# Patient Record
Sex: FEMALE | Race: OTHER | HISPANIC OR LATINO | URBAN - METROPOLITAN AREA
[De-identification: names, ages, dates, MRNs, and addresses within clinical notes are randomized per-mention and may not be internally consistent; named-entity substitution may affect disease eponyms.]

---

## 2021-08-14 ENCOUNTER — INPATIENT (INPATIENT)
Facility: HOSPITAL | Age: 86
LOS: 2 days | Discharge: HOME | End: 2021-08-17
Attending: HOSPITALIST | Admitting: HOSPITALIST
Payer: MEDICARE

## 2021-08-14 VITALS
SYSTOLIC BLOOD PRESSURE: 105 MMHG | DIASTOLIC BLOOD PRESSURE: 67 MMHG | TEMPERATURE: 98 F | HEART RATE: 85 BPM | OXYGEN SATURATION: 95 % | RESPIRATION RATE: 18 BRPM

## 2021-08-14 DIAGNOSIS — U07.1 COVID-19: ICD-10-CM

## 2021-08-14 LAB
ALBUMIN SERPL ELPH-MCNC: 3.7 G/DL — SIGNIFICANT CHANGE UP (ref 3.3–5)
ALP SERPL-CCNC: 60 U/L — SIGNIFICANT CHANGE UP (ref 40–120)
ALT FLD-CCNC: 13 U/L — SIGNIFICANT CHANGE UP (ref 4–33)
ANION GAP SERPL CALC-SCNC: 12 MMOL/L — SIGNIFICANT CHANGE UP (ref 7–14)
APPEARANCE UR: CLEAR — SIGNIFICANT CHANGE UP
AST SERPL-CCNC: 19 U/L — SIGNIFICANT CHANGE UP (ref 4–32)
BASOPHILS # BLD AUTO: 0.05 K/UL — SIGNIFICANT CHANGE UP (ref 0–0.2)
BASOPHILS NFR BLD AUTO: 0.4 % — SIGNIFICANT CHANGE UP (ref 0–2)
BILIRUB SERPL-MCNC: 0.4 MG/DL — SIGNIFICANT CHANGE UP (ref 0.2–1.2)
BILIRUB UR-MCNC: NEGATIVE — SIGNIFICANT CHANGE UP
BUN SERPL-MCNC: 18 MG/DL — SIGNIFICANT CHANGE UP (ref 7–23)
CALCIUM SERPL-MCNC: 9.6 MG/DL — SIGNIFICANT CHANGE UP (ref 8.4–10.5)
CHLORIDE SERPL-SCNC: 101 MMOL/L — SIGNIFICANT CHANGE UP (ref 98–107)
CO2 SERPL-SCNC: 23 MMOL/L — SIGNIFICANT CHANGE UP (ref 22–31)
COLOR SPEC: YELLOW — SIGNIFICANT CHANGE UP
CREAT SERPL-MCNC: 0.82 MG/DL — SIGNIFICANT CHANGE UP (ref 0.5–1.3)
CRP SERPL-MCNC: 17.8 MG/L — HIGH
D DIMER BLD IA.RAPID-MCNC: 601 NG/ML DDU — HIGH
DIFF PNL FLD: ABNORMAL
EOSINOPHIL # BLD AUTO: 0.36 K/UL — SIGNIFICANT CHANGE UP (ref 0–0.5)
EOSINOPHIL NFR BLD AUTO: 3.1 % — SIGNIFICANT CHANGE UP (ref 0–6)
FERRITIN SERPL-MCNC: 108 NG/ML — SIGNIFICANT CHANGE UP (ref 15–150)
GLUCOSE SERPL-MCNC: 126 MG/DL — HIGH (ref 70–99)
GLUCOSE UR QL: NEGATIVE — SIGNIFICANT CHANGE UP
HCT VFR BLD CALC: 38 % — SIGNIFICANT CHANGE UP (ref 34.5–45)
HGB BLD-MCNC: 12.4 G/DL — SIGNIFICANT CHANGE UP (ref 11.5–15.5)
IANC: 7.35 K/UL — SIGNIFICANT CHANGE UP (ref 1.5–8.5)
IMM GRANULOCYTES NFR BLD AUTO: 0.9 % — SIGNIFICANT CHANGE UP (ref 0–1.5)
KETONES UR-MCNC: NEGATIVE — SIGNIFICANT CHANGE UP
LEUKOCYTE ESTERASE UR-ACNC: NEGATIVE — SIGNIFICANT CHANGE UP
LYMPHOCYTES # BLD AUTO: 2.87 K/UL — SIGNIFICANT CHANGE UP (ref 1–3.3)
LYMPHOCYTES # BLD AUTO: 24.6 % — SIGNIFICANT CHANGE UP (ref 13–44)
MCHC RBC-ENTMCNC: 29.1 PG — SIGNIFICANT CHANGE UP (ref 27–34)
MCHC RBC-ENTMCNC: 32.6 GM/DL — SIGNIFICANT CHANGE UP (ref 32–36)
MCV RBC AUTO: 89.2 FL — SIGNIFICANT CHANGE UP (ref 80–100)
MONOCYTES # BLD AUTO: 0.92 K/UL — HIGH (ref 0–0.9)
MONOCYTES NFR BLD AUTO: 7.9 % — SIGNIFICANT CHANGE UP (ref 2–14)
NEUTROPHILS # BLD AUTO: 7.35 K/UL — SIGNIFICANT CHANGE UP (ref 1.8–7.4)
NEUTROPHILS NFR BLD AUTO: 63.1 % — SIGNIFICANT CHANGE UP (ref 43–77)
NITRITE UR-MCNC: POSITIVE
NRBC # BLD: 0 /100 WBCS — SIGNIFICANT CHANGE UP
NRBC # FLD: 0 K/UL — SIGNIFICANT CHANGE UP
NT-PROBNP SERPL-SCNC: 269 PG/ML — SIGNIFICANT CHANGE UP
PH UR: 6 — SIGNIFICANT CHANGE UP (ref 5–8)
PLATELET # BLD AUTO: 327 K/UL — SIGNIFICANT CHANGE UP (ref 150–400)
POTASSIUM SERPL-MCNC: 4.8 MMOL/L — SIGNIFICANT CHANGE UP (ref 3.5–5.3)
POTASSIUM SERPL-SCNC: 4.8 MMOL/L — SIGNIFICANT CHANGE UP (ref 3.5–5.3)
PROCALCITONIN SERPL-MCNC: 0.03 NG/ML — SIGNIFICANT CHANGE UP (ref 0.02–0.1)
PROT SERPL-MCNC: 7 G/DL — SIGNIFICANT CHANGE UP (ref 6–8.3)
PROT UR-MCNC: ABNORMAL
RBC # BLD: 4.26 M/UL — SIGNIFICANT CHANGE UP (ref 3.8–5.2)
RBC # FLD: 12.9 % — SIGNIFICANT CHANGE UP (ref 10.3–14.5)
SODIUM SERPL-SCNC: 136 MMOL/L — SIGNIFICANT CHANGE UP (ref 135–145)
SP GR SPEC: 1.02 — SIGNIFICANT CHANGE UP (ref 1.01–1.02)
TROPONIN T, HIGH SENSITIVITY RESULT: 12 NG/L — SIGNIFICANT CHANGE UP
TSH SERPL-MCNC: 0.49 UIU/ML — SIGNIFICANT CHANGE UP (ref 0.27–4.2)
UROBILINOGEN FLD QL: SIGNIFICANT CHANGE UP
WBC # BLD: 11.66 K/UL — HIGH (ref 3.8–10.5)
WBC # FLD AUTO: 11.66 K/UL — HIGH (ref 3.8–10.5)

## 2021-08-14 PROCEDURE — 71045 X-RAY EXAM CHEST 1 VIEW: CPT | Mod: 26

## 2021-08-14 PROCEDURE — 99285 EMERGENCY DEPT VISIT HI MDM: CPT | Mod: 25

## 2021-08-14 PROCEDURE — 93010 ELECTROCARDIOGRAM REPORT: CPT

## 2021-08-14 RX ORDER — ACETAMINOPHEN 500 MG
975 TABLET ORAL ONCE
Refills: 0 | Status: DISCONTINUED | OUTPATIENT
Start: 2021-08-14 | End: 2021-08-14

## 2021-08-14 RX ORDER — ACETAMINOPHEN 500 MG
1000 TABLET ORAL ONCE
Refills: 0 | Status: COMPLETED | OUTPATIENT
Start: 2021-08-14 | End: 2021-08-14

## 2021-08-14 RX ADMIN — Medication 400 MILLIGRAM(S): at 21:43

## 2021-08-14 NOTE — ED PROVIDER NOTE - CLINICAL SUMMARY MEDICAL DECISION MAKING FREE TEXT BOX
ID: [id number] (English Ambrocio) - 89 female, Hx: HTN, HLD, Parkinsons, Dementia - presents 2 weeks positive for COVID, with failure to thrive, decreased PO intake, and inability to care for herself. Pt is non-verbal at baseline, lives with daughter, but the family is no longer able to care for her. Exam, presentation, and history concerning for likely FTT in the setting of infection - evaluation is NOT consistent with acute CVA (moving all 4 extremities, no acute change in mental status), meningitis, encephalitis (neck supple, no rashes, non-toxic). Plan: CBC, CMP, EKG, CXR, UA, UCx, COVID, Tylenol, TBA. VSS, non-toxic. 89 yr old female c HTN, HLD, Parkinsons, Dementia - presents 2 weeks positive for COVID, with failure to thrive, decreased PO intake, and inability to care for herself. Has known covid infection, unvaccinated, appears to be worsening and family is unable to care for her.  Exam, presentation, and history concerning for likely FTT in the setting of infection.  Has mild resp distress.  Is non verbal at baseline but no e/o new neuro deficits.  Will send labs, CXR, provide manid hydration, admit.

## 2021-08-14 NOTE — ED ADULT NURSE NOTE - OBJECTIVE STATEMENT
Received PT to room 22, AAO1, nonambulatory, Czech speaking, Pmhx: HTN, HLD, Parkinsons, Dementia. S/p covid + x 2weeks, FTT, decreased PO intake. Presents to ED, lethargic. Arouses to verbal and physical stimuli. Respirations even and unlabored, sp02 98% on RA. Incontinent with diaper, redness and excoriation noted to pelvic area. LAC 20g placed, labs drawn, straight cath with sterile technique - yellow urine output, covid swabbed, bed in lowest position, will continue to monitor.

## 2021-08-14 NOTE — ED PROVIDER NOTE - ATTENDING CONTRIBUTION TO CARE
Attending Attestation: Dr. Ortega  I have personally performed a history and physical examination of the patient and discussed management with the resident as well as the patient.  I reviewed the resident's note and agree with the documented findings and plan of care.  I have authored and modified critical sections of the Provider Note, including but not limited to HPI, Physical Exam and MDM.  89 yr old female c HTN, HLD, Parkinsons, Dementia - presents 2 weeks positive for COVID, with failure to thrive, decreased PO intake, and inability to care for herself. Has known covid infection, unvaccinated, appears to be worsening and family is unable to care for her.  Exam, presentation, and history concerning for likely FTT in the setting of infection.  Has mild resp distress.  Is non verbal at baseline but no e/o new neuro deficits.  Will send labs, CXR, provide mandi hydration, admit.

## 2021-08-14 NOTE — ED PROVIDER NOTE - PHYSICAL EXAMINATION
GEN - NAD; non-toxic; A+O x 0, non-verbal  HENT - NC/AT, No visible Ecchymosis, No Abrasions, No Lac/Tears, MMM, no discharge  EYES - EOMI, PERRL, no conjunctival pallor, no scleral icterus  NECK - Neck supple, No LAD, No Swelling  PULM - Tachypneic; Coarse B/L,  symmetric breath sounds  CV -  RRR, S1 S2, no murmurs 2+ Pulses B/L UE  GI - NT/ND, soft, no guarding, no rebound, no masses    MSK/EXT- no edema, no gross deformity, warm and well perfused, no calf tenderness/swelling/erythema   SKIN - no rash or bruising  NEUROLOGIC - alert, moving all 4 ext with 5/5 Strength

## 2021-08-14 NOTE — ED PROVIDER NOTE - OBJECTIVE STATEMENT
ID: [id number] (Croatian Ambrocio) - 89 female, Hx: HTN, HLD, Parkinsons, Dementia - presents 2 weeks positive for COVID, with failure to thrive, decreased PO intake, and inability to care for herself. Pt is non-verbal at baseline, lives with daughter, but the family is no longer able to care for her. Reports subjective fevers, dyspnea; denies any diarrhea, bloody stools, foul odor from urine. Denies any trauma/falls. Not vaccinated.     Dieter Daughter: [phone]  Lucrecia Granddaughter: [phone]  PMD: Unknown  ID: [id number] (Greek Ambrocio) - 89 yr old female, Hx: HTN, HLD, Parkinsons, Dementia - presents 2 weeks positive for COVID, with failure to thrive, decreased PO intake, and inability to care for herself. Pt is non-verbal at baseline, lives with daughter, but the family is no longer able to care for her. Family reports subjective fevers, dyspnea; denies any diarrhea, bloody stools, foul odor from urine. Denies any trauma/falls. Not vaccinated.     Dieter Daughter: [phone]  Lucrecia Granddaughter: [phone]  PMD: Unknown

## 2021-08-14 NOTE — ED PROVIDER NOTE - CARE PLAN
Principal Discharge DX:	Adult failure to thrive  Secondary Diagnosis:	2019 novel coronavirus disease (COVID-19)   1

## 2021-08-14 NOTE — ED ADULT TRIAGE NOTE - CHIEF COMPLAINT QUOTE
Pt brought in by family for cough and decrease appetite, positive covid. pt nonambulatory, nonverbal. Endorses subjected fever/chills and weakness  Hx Parkinson, alzheimer, dm and hld Pt brought in by family for cough and decrease appetite, positive covid. pt nonambulatory, nonverbal. Endorses subjected fever/chills and weakness  Hx Parkinson, alzheimer, dm and hld  daughter Dieter [phone]

## 2021-08-14 NOTE — ED PROVIDER NOTE - PROGRESS NOTE DETAILS
Resident Lv: preliminary evaluation concerning for COVID-19 related weakness and FTT. Case endorsed to Hospitalist (MD Brian).

## 2021-08-14 NOTE — ED ADULT NURSE NOTE - CHIEF COMPLAINT QUOTE
Pt brought in by family for cough and decrease appetite, positive covid. pt nonambulatory, nonverbal. Endorses subjected fever/chills and weakness  Hx Parkinson, alzheimer, dm and hld  daughter Dieter [phone]

## 2021-08-15 DIAGNOSIS — Z29.9 ENCOUNTER FOR PROPHYLACTIC MEASURES, UNSPECIFIED: ICD-10-CM

## 2021-08-15 DIAGNOSIS — G20 PARKINSON'S DISEASE: ICD-10-CM

## 2021-08-15 DIAGNOSIS — N39.0 URINARY TRACT INFECTION, SITE NOT SPECIFIED: ICD-10-CM

## 2021-08-15 DIAGNOSIS — E11.9 TYPE 2 DIABETES MELLITUS WITHOUT COMPLICATIONS: ICD-10-CM

## 2021-08-15 DIAGNOSIS — R13.10 DYSPHAGIA, UNSPECIFIED: ICD-10-CM

## 2021-08-15 DIAGNOSIS — F03.90 UNSPECIFIED DEMENTIA, UNSPECIFIED SEVERITY, WITHOUT BEHAVIORAL DISTURBANCE, PSYCHOTIC DISTURBANCE, MOOD DISTURBANCE, AND ANXIETY: ICD-10-CM

## 2021-08-15 DIAGNOSIS — U07.1 COVID-19: ICD-10-CM

## 2021-08-15 DIAGNOSIS — E78.5 HYPERLIPIDEMIA, UNSPECIFIED: ICD-10-CM

## 2021-08-15 LAB
24R-OH-CALCIDIOL SERPL-MCNC: 31.7 NG/ML — SIGNIFICANT CHANGE UP (ref 30–80)
A1C WITH ESTIMATED AVERAGE GLUCOSE RESULT: 6.1 % — HIGH (ref 4–5.6)
ANION GAP SERPL CALC-SCNC: 13 MMOL/L — SIGNIFICANT CHANGE UP (ref 7–14)
APTT BLD: 31.1 SEC — SIGNIFICANT CHANGE UP (ref 27–36.3)
BUN SERPL-MCNC: 16 MG/DL — SIGNIFICANT CHANGE UP (ref 7–23)
CALCIUM SERPL-MCNC: 9.5 MG/DL — SIGNIFICANT CHANGE UP (ref 8.4–10.5)
CHLORIDE SERPL-SCNC: 103 MMOL/L — SIGNIFICANT CHANGE UP (ref 98–107)
CHOLEST SERPL-MCNC: 119 MG/DL — SIGNIFICANT CHANGE UP
CO2 SERPL-SCNC: 23 MMOL/L — SIGNIFICANT CHANGE UP (ref 22–31)
CREAT SERPL-MCNC: 0.74 MG/DL — SIGNIFICANT CHANGE UP (ref 0.5–1.3)
CRP SERPL-MCNC: 19.4 MG/L — HIGH
D DIMER BLD IA.RAPID-MCNC: 693 NG/ML DDU — HIGH
ESTIMATED AVERAGE GLUCOSE: 128 — SIGNIFICANT CHANGE UP
FERRITIN SERPL-MCNC: 115 NG/ML — SIGNIFICANT CHANGE UP (ref 15–150)
GLUCOSE BLDC GLUCOMTR-MCNC: 103 MG/DL — HIGH (ref 70–99)
GLUCOSE BLDC GLUCOMTR-MCNC: 108 MG/DL — HIGH (ref 70–99)
GLUCOSE BLDC GLUCOMTR-MCNC: 108 MG/DL — HIGH (ref 70–99)
GLUCOSE BLDC GLUCOMTR-MCNC: 113 MG/DL — HIGH (ref 70–99)
GLUCOSE BLDC GLUCOMTR-MCNC: 115 MG/DL — HIGH (ref 70–99)
GLUCOSE SERPL-MCNC: 106 MG/DL — HIGH (ref 70–99)
HCT VFR BLD CALC: 37.4 % — SIGNIFICANT CHANGE UP (ref 34.5–45)
HDLC SERPL-MCNC: 33 MG/DL — LOW
HGB BLD-MCNC: 12.5 G/DL — SIGNIFICANT CHANGE UP (ref 11.5–15.5)
INR BLD: 1.05 RATIO — SIGNIFICANT CHANGE UP (ref 0.88–1.16)
LACTATE SERPL-SCNC: 1.6 MMOL/L — SIGNIFICANT CHANGE UP (ref 0.5–2)
LDH SERPL L TO P-CCNC: 135 U/L — SIGNIFICANT CHANGE UP (ref 135–225)
LIPID PNL WITH DIRECT LDL SERPL: 57 MG/DL — SIGNIFICANT CHANGE UP
MAGNESIUM SERPL-MCNC: 1.8 MG/DL — SIGNIFICANT CHANGE UP (ref 1.6–2.6)
MCHC RBC-ENTMCNC: 29.6 PG — SIGNIFICANT CHANGE UP (ref 27–34)
MCHC RBC-ENTMCNC: 33.4 GM/DL — SIGNIFICANT CHANGE UP (ref 32–36)
MCV RBC AUTO: 88.6 FL — SIGNIFICANT CHANGE UP (ref 80–100)
NON HDL CHOLESTEROL: 86 MG/DL — SIGNIFICANT CHANGE UP
NRBC # BLD: 0 /100 WBCS — SIGNIFICANT CHANGE UP
NRBC # FLD: 0 K/UL — SIGNIFICANT CHANGE UP
PHOSPHATE SERPL-MCNC: 3.5 MG/DL — SIGNIFICANT CHANGE UP (ref 2.5–4.5)
PLATELET # BLD AUTO: 328 K/UL — SIGNIFICANT CHANGE UP (ref 150–400)
POTASSIUM SERPL-MCNC: 4.2 MMOL/L — SIGNIFICANT CHANGE UP (ref 3.5–5.3)
POTASSIUM SERPL-SCNC: 4.2 MMOL/L — SIGNIFICANT CHANGE UP (ref 3.5–5.3)
PROCALCITONIN SERPL-MCNC: 0.03 NG/ML — SIGNIFICANT CHANGE UP (ref 0.02–0.1)
PROTHROM AB SERPL-ACNC: 12 SEC — SIGNIFICANT CHANGE UP (ref 10.6–13.6)
RBC # BLD: 4.22 M/UL — SIGNIFICANT CHANGE UP (ref 3.8–5.2)
RBC # FLD: 12.9 % — SIGNIFICANT CHANGE UP (ref 10.3–14.5)
SARS-COV-2 RNA SPEC QL NAA+PROBE: DETECTED
SODIUM SERPL-SCNC: 139 MMOL/L — SIGNIFICANT CHANGE UP (ref 135–145)
TRIGL SERPL-MCNC: 144 MG/DL — SIGNIFICANT CHANGE UP
WBC # BLD: 10.5 K/UL — SIGNIFICANT CHANGE UP (ref 3.8–10.5)
WBC # FLD AUTO: 10.5 K/UL — SIGNIFICANT CHANGE UP (ref 3.8–10.5)

## 2021-08-15 PROCEDURE — 99223 1ST HOSP IP/OBS HIGH 75: CPT

## 2021-08-15 PROCEDURE — 12345: CPT | Mod: NC

## 2021-08-15 RX ORDER — SODIUM CHLORIDE 9 MG/ML
1000 INJECTION, SOLUTION INTRAVENOUS
Refills: 0 | Status: DISCONTINUED | OUTPATIENT
Start: 2021-08-15 | End: 2021-08-17

## 2021-08-15 RX ORDER — ENOXAPARIN SODIUM 100 MG/ML
40 INJECTION SUBCUTANEOUS DAILY
Refills: 0 | Status: DISCONTINUED | OUTPATIENT
Start: 2021-08-15 | End: 2021-08-17

## 2021-08-15 RX ORDER — CEFTRIAXONE 500 MG/1
1000 INJECTION, POWDER, FOR SOLUTION INTRAMUSCULAR; INTRAVENOUS EVERY 24 HOURS
Refills: 0 | Status: DISCONTINUED | OUTPATIENT
Start: 2021-08-16 | End: 2021-08-17

## 2021-08-15 RX ORDER — CEFTRIAXONE 500 MG/1
INJECTION, POWDER, FOR SOLUTION INTRAMUSCULAR; INTRAVENOUS
Refills: 0 | Status: DISCONTINUED | OUTPATIENT
Start: 2021-08-15 | End: 2021-08-17

## 2021-08-15 RX ORDER — DEXTROSE 50 % IN WATER 50 %
12.5 SYRINGE (ML) INTRAVENOUS ONCE
Refills: 0 | Status: DISCONTINUED | OUTPATIENT
Start: 2021-08-15 | End: 2021-08-17

## 2021-08-15 RX ORDER — CEFTRIAXONE 500 MG/1
1000 INJECTION, POWDER, FOR SOLUTION INTRAMUSCULAR; INTRAVENOUS ONCE
Refills: 0 | Status: COMPLETED | OUTPATIENT
Start: 2021-08-15 | End: 2021-08-15

## 2021-08-15 RX ORDER — ACETAMINOPHEN 500 MG
650 TABLET ORAL EVERY 6 HOURS
Refills: 0 | Status: DISCONTINUED | OUTPATIENT
Start: 2021-08-15 | End: 2021-08-16

## 2021-08-15 RX ORDER — DEXTROSE 50 % IN WATER 50 %
15 SYRINGE (ML) INTRAVENOUS ONCE
Refills: 0 | Status: DISCONTINUED | OUTPATIENT
Start: 2021-08-15 | End: 2021-08-17

## 2021-08-15 RX ORDER — DEXTROSE 50 % IN WATER 50 %
25 SYRINGE (ML) INTRAVENOUS ONCE
Refills: 0 | Status: DISCONTINUED | OUTPATIENT
Start: 2021-08-15 | End: 2021-08-17

## 2021-08-15 RX ORDER — ASPIRIN/CALCIUM CARB/MAGNESIUM 324 MG
1 TABLET ORAL
Qty: 0 | Refills: 0 | DISCHARGE

## 2021-08-15 RX ORDER — PREGABALIN 225 MG/1
1 CAPSULE ORAL
Qty: 0 | Refills: 0 | DISCHARGE

## 2021-08-15 RX ORDER — ALBUTEROL 90 UG/1
2 AEROSOL, METERED ORAL EVERY 6 HOURS
Refills: 0 | Status: DISCONTINUED | OUTPATIENT
Start: 2021-08-15 | End: 2021-08-17

## 2021-08-15 RX ORDER — INSULIN LISPRO 100/ML
VIAL (ML) SUBCUTANEOUS AT BEDTIME
Refills: 0 | Status: DISCONTINUED | OUTPATIENT
Start: 2021-08-15 | End: 2021-08-17

## 2021-08-15 RX ORDER — DONEPEZIL HYDROCHLORIDE 10 MG/1
1 TABLET, FILM COATED ORAL
Qty: 0 | Refills: 0 | DISCHARGE

## 2021-08-15 RX ORDER — SITAGLIPTIN 50 MG/1
1 TABLET, FILM COATED ORAL
Qty: 0 | Refills: 0 | DISCHARGE

## 2021-08-15 RX ORDER — INSULIN LISPRO 100/ML
VIAL (ML) SUBCUTANEOUS
Refills: 0 | Status: DISCONTINUED | OUTPATIENT
Start: 2021-08-15 | End: 2021-08-17

## 2021-08-15 RX ORDER — CARBIDOPA AND LEVODOPA 25; 100 MG/1; MG/1
1 TABLET ORAL
Qty: 0 | Refills: 0 | DISCHARGE

## 2021-08-15 RX ORDER — GLUCAGON INJECTION, SOLUTION 0.5 MG/.1ML
1 INJECTION, SOLUTION SUBCUTANEOUS ONCE
Refills: 0 | Status: DISCONTINUED | OUTPATIENT
Start: 2021-08-15 | End: 2021-08-17

## 2021-08-15 RX ORDER — PYRIDOXINE HCL (VITAMIN B6) 100 MG
1 TABLET ORAL
Qty: 0 | Refills: 0 | DISCHARGE

## 2021-08-15 RX ADMIN — CEFTRIAXONE 100 MILLIGRAM(S): 500 INJECTION, POWDER, FOR SOLUTION INTRAMUSCULAR; INTRAVENOUS at 05:26

## 2021-08-15 RX ADMIN — ENOXAPARIN SODIUM 40 MILLIGRAM(S): 100 INJECTION SUBCUTANEOUS at 16:50

## 2021-08-15 NOTE — H&P ADULT - PROBLEM SELECTOR PLAN 5
-BS = 126  -FS = 108.  -NPO due to failed dysphagia screen.  -F/U A1C  -FS QID  -Low corrective ISS with hypoglycemia protocol.

## 2021-08-15 NOTE — ED ADULT NURSE REASSESSMENT NOTE - NS ED NURSE REASSESS COMMENT FT1
Dysphagia screening fail. Only able to tolerate 5ml of water. Unsuccessful with 20ml of water. Pt proceeded to cough and spitting out water. ANTONY Medina made aware.

## 2021-08-15 NOTE — H&P ADULT - ASSESSMENT
89F, history of Parkinson's disease, also history of alzheimer's, DM2 and Hyperlipidemia, admitted for generalized weakness most likely due to COVID infection, UTI and failed bedside dysphagia screen.

## 2021-08-15 NOTE — H&P ADULT - NSHPLABSRESULTS_GEN_ALL_CORE
- CXR Preliminary : Clear  - UA : Positive Nitrates  - COVID PCR: Detected  - D Dimer= 601  - Trop= 12  - ProBNP 269  - CRP = 178  - Ferrtitin= 108.  - Procalcitonin- 0.03   - TSH = 0.69                          12.4   11.66 )-----------( 327      ( 14 Aug 2021 21:46 )             38.0   08-14    136  |  101  |  18  ----------------------------<  126<H>  4.8   |  23  |  0.82    Ca    9.6      14 Aug 2021 21:46    TPro  7.0  /  Alb  3.7  /  TBili  0.4  /  DBili  x   /  AST  19  /  ALT  13  /  AlkPhos  60  08-14

## 2021-08-15 NOTE — H&P ADULT - PROBLEM SELECTOR PLAN 1
- Failed bedside dysphagia screen.  - NPO including meds due to aspiration risk.  - Aspiration precautions.  - F/U Speech and swallow eval.  - Hold IVF due to COVID infection and risk of pleural effusion. -Airborne precautions.  -F/U Inflammatory markers.   -Give O2 VIA NC and titrate as per guidelines.  -Currently SPO2= 97% ra & RR= 18b/ min,  -Give Tylenol MI, Pro Air inhaler PRN and incentive spirometry.   -Prone the pt for ARSs.

## 2021-08-15 NOTE — H&P ADULT - HISTORY OF PRESENT ILLNESS
History and currenty home medications provided by the emergency contact/ daughter Oneyda Corral and her spouse by phone @ [phone], due to the pt unable to provide for information for her hospital visit.     89F, German speaking, ambulates with a wheelchair due to history of Parkinson's disease, also history of alzheimer's, DM2 and Hyperlipidemia, the pt has been noted with generalized weakness over the past week, recent family member, diagnosed with COVID, the pt was taken to an urgency center by a daughter on 8/14 and tested positive for COVID. The daughter then brought her to the ED due to the pt with generalized weakness over the past week. The daughter says she noticed the pt with a dry cough. Says the pt did not complain of HA, dizziness, SON, nausea, vomit, diarrhea, constipation, abdominal pain, dysuria, chills or body aches. The emergency contact says the pt never complains and when asked will most likely say " I'm good."    In the Ed, the pt failed the bedside dysphagia screen.   COVID PCR: Detected.  UA Positive Nitrates.     Vitals: T Max= 100.1 rectal, HR = 63b/ min, BP = 110/62, RR= 18b/ min, SPO2== 97% ra

## 2021-08-15 NOTE — H&P ADULT - PROBLEM SELECTOR PLAN 8
-VTE with Lovenox 40 mg sub cut daily.  -Fall, aspiration, safety and seizure precautions.  -Speech and swallow eval.

## 2021-08-15 NOTE — H&P ADULT - PROBLEM SELECTOR PLAN 2
-Airborne precautions.  -F/U Inflammatory markers.   -Give O2 VIA NC and titrate as per guidelines.  -Currently SPO2= 97% ra & RR= 18b/ min,  -Give Tylenol NM, Pro Air inhaler PRN and incentive spirometry.   -Prone the pt for ARSs. - Failed bedside dysphagia screen.  - NPO including meds due to aspiration risk.  - Aspiration precautions.  - F/U Speech and swallow eval.  - Hold IVF due to COVID infection and risk of pleural effusion.

## 2021-08-15 NOTE — H&P ADULT - ATTENDING COMMENTS
89 yr old female with a pmh of dementia AAO x 1, Parkinsons, HTN, HLD, T2DM, COVID + 8/13 (not vaccinated) presents with decreased oral intake as well as decreased responsiveness who was brought in by family as they are no longer able to care for her. Per ED note patient is nonverbal and lives with her daughter. Family reported subjective fevers and dyspnea.   Vitals in the ED: Tmax 100.1, HR 80, /58, RR 24->16 satting 99% RA    Review of Systems:   Unable to assess from patient herself as she is drowsy and lethargic  ROS per family as above     Physical Exam: PHYSICAL EXAM:  GENERAL: NAD, well-developed, well-nourished  HEAD:  Atraumatic, Normocephalic, swelling of left cheek/oral mucosa  EYES: EOMI, PERRL, conjunctiva and sclera clear  NECK: Supple, No JVD  CHEST/LUNG: clear breathe sounds bilaterally; No wheezes, rales or rhonchi  HEART: Regular rate and rhythm; No murmurs, rubs, or gallops, (+)S1, S2  ABDOMEN: Soft, Nontender, Nondistended; Normal Bowel sounds   EXTREMITIES:  2+ Peripheral Pulses, No clubbing, cyanosis, trace bilateral lower extremity edema  PSYCH: unable to assess due to patients mentation  NEUROLOGY: AAOx1(per family), non-focal  SKIN: No rashes or lesions    CXR reviewed by myself: no acute abnormality      COVID  Acute but stable  Recent diagnosis of COVID  Inflammatory markers elevated  Supportive Care    Dysphagia  New  Failed bedside swallow  NPO  Speech evaluation     UTI  New   Vitals in the ED: Tmax 100.1, HR 80, /58, RR 24->16 satting 99% RA  WBC 11.66, CRP 17.8  UA Nitrates + with many bacteria   Send Ucx  Rocephin x 3    COVID  Acute but stable  Recent diagnosis of COVID  Inflammatory markers elevated  Supportive Care    Dementia  Chronic moderate exacerbation  Continue donepezil 10mg daily once sees speech    Parkinsons  Chronic stable   Continue carbidopa-levodopa 10-100mg BID once sees speech    T2DM  Chronic moderate exacerbation     LDCS, currently NPO

## 2021-08-15 NOTE — H&P ADULT - PROBLEM SELECTOR PLAN 3
-UA positive Nitrates.  -Started on Ceftriaxone 1G IV daily.  -F/U Blood and urine cx.   -Tylenol MD   -IVF on hold due to COVID

## 2021-08-15 NOTE — H&P ADULT - NSHPSOCIALHISTORY_GEN_ALL_CORE
.  Lives with a daughter recently diagnosed with COVID.   Denies Nicotine, ETOH, Illicit/ recreational drug use/history.   Flu Vax: Denies  COVID Vax: Denies.

## 2021-08-16 LAB
ALBUMIN SERPL ELPH-MCNC: 3.5 G/DL — SIGNIFICANT CHANGE UP (ref 3.3–5)
ALP SERPL-CCNC: 49 U/L — SIGNIFICANT CHANGE UP (ref 40–120)
ALT FLD-CCNC: 10 U/L — SIGNIFICANT CHANGE UP (ref 4–33)
ANION GAP SERPL CALC-SCNC: 13 MMOL/L — SIGNIFICANT CHANGE UP (ref 7–14)
AST SERPL-CCNC: 15 U/L — SIGNIFICANT CHANGE UP (ref 4–32)
BILIRUB SERPL-MCNC: 0.6 MG/DL — SIGNIFICANT CHANGE UP (ref 0.2–1.2)
BUN SERPL-MCNC: 16 MG/DL — SIGNIFICANT CHANGE UP (ref 7–23)
CALCIUM SERPL-MCNC: 9.4 MG/DL — SIGNIFICANT CHANGE UP (ref 8.4–10.5)
CHLORIDE SERPL-SCNC: 99 MMOL/L — SIGNIFICANT CHANGE UP (ref 98–107)
CO2 SERPL-SCNC: 24 MMOL/L — SIGNIFICANT CHANGE UP (ref 22–31)
COVID-19 SPIKE DOMAIN AB INTERP: POSITIVE
COVID-19 SPIKE DOMAIN ANTIBODY RESULT: 11.1 U/ML — HIGH
CREAT SERPL-MCNC: 0.7 MG/DL — SIGNIFICANT CHANGE UP (ref 0.5–1.3)
GLUCOSE BLDC GLUCOMTR-MCNC: 100 MG/DL — HIGH (ref 70–99)
GLUCOSE BLDC GLUCOMTR-MCNC: 102 MG/DL — HIGH (ref 70–99)
GLUCOSE BLDC GLUCOMTR-MCNC: 103 MG/DL — HIGH (ref 70–99)
GLUCOSE BLDC GLUCOMTR-MCNC: 117 MG/DL — HIGH (ref 70–99)
GLUCOSE BLDC GLUCOMTR-MCNC: 99 MG/DL — SIGNIFICANT CHANGE UP (ref 70–99)
GLUCOSE SERPL-MCNC: 100 MG/DL — HIGH (ref 70–99)
HCT VFR BLD CALC: 39.2 % — SIGNIFICANT CHANGE UP (ref 34.5–45)
HGB BLD-MCNC: 12.6 G/DL — SIGNIFICANT CHANGE UP (ref 11.5–15.5)
MAGNESIUM SERPL-MCNC: 1.9 MG/DL — SIGNIFICANT CHANGE UP (ref 1.6–2.6)
MCHC RBC-ENTMCNC: 28.8 PG — SIGNIFICANT CHANGE UP (ref 27–34)
MCHC RBC-ENTMCNC: 32.1 GM/DL — SIGNIFICANT CHANGE UP (ref 32–36)
MCV RBC AUTO: 89.5 FL — SIGNIFICANT CHANGE UP (ref 80–100)
NRBC # BLD: 0 /100 WBCS — SIGNIFICANT CHANGE UP
NRBC # FLD: 0 K/UL — SIGNIFICANT CHANGE UP
PHOSPHATE SERPL-MCNC: 3.6 MG/DL — SIGNIFICANT CHANGE UP (ref 2.5–4.5)
PLATELET # BLD AUTO: 365 K/UL — SIGNIFICANT CHANGE UP (ref 150–400)
POTASSIUM SERPL-MCNC: 4.2 MMOL/L — SIGNIFICANT CHANGE UP (ref 3.5–5.3)
POTASSIUM SERPL-SCNC: 4.2 MMOL/L — SIGNIFICANT CHANGE UP (ref 3.5–5.3)
PROT SERPL-MCNC: 6.9 G/DL — SIGNIFICANT CHANGE UP (ref 6–8.3)
RBC # BLD: 4.38 M/UL — SIGNIFICANT CHANGE UP (ref 3.8–5.2)
RBC # FLD: 12.8 % — SIGNIFICANT CHANGE UP (ref 10.3–14.5)
SARS-COV-2 IGG+IGM SERPL QL IA: 11.1 U/ML — HIGH
SARS-COV-2 IGG+IGM SERPL QL IA: POSITIVE
SODIUM SERPL-SCNC: 136 MMOL/L — SIGNIFICANT CHANGE UP (ref 135–145)
WBC # BLD: 9.15 K/UL — SIGNIFICANT CHANGE UP (ref 3.8–10.5)
WBC # FLD AUTO: 9.15 K/UL — SIGNIFICANT CHANGE UP (ref 3.8–10.5)

## 2021-08-16 PROCEDURE — 99232 SBSQ HOSP IP/OBS MODERATE 35: CPT

## 2021-08-16 RX ORDER — ASPIRIN/CALCIUM CARB/MAGNESIUM 324 MG
81 TABLET ORAL DAILY
Refills: 0 | Status: DISCONTINUED | OUTPATIENT
Start: 2021-08-16 | End: 2021-08-17

## 2021-08-16 RX ORDER — DONEPEZIL HYDROCHLORIDE 10 MG/1
10 TABLET, FILM COATED ORAL AT BEDTIME
Refills: 0 | Status: DISCONTINUED | OUTPATIENT
Start: 2021-08-16 | End: 2021-08-17

## 2021-08-16 RX ORDER — PYRIDOXINE HCL (VITAMIN B6) 100 MG
50 TABLET ORAL DAILY
Refills: 0 | Status: DISCONTINUED | OUTPATIENT
Start: 2021-08-16 | End: 2021-08-17

## 2021-08-16 RX ORDER — PREGABALIN 225 MG/1
1000 CAPSULE ORAL AT BEDTIME
Refills: 0 | Status: DISCONTINUED | OUTPATIENT
Start: 2021-08-16 | End: 2021-08-17

## 2021-08-16 RX ORDER — CARBIDOPA AND LEVODOPA 25; 100 MG/1; MG/1
1 TABLET ORAL
Refills: 0 | Status: DISCONTINUED | OUTPATIENT
Start: 2021-08-16 | End: 2021-08-17

## 2021-08-16 RX ADMIN — CARBIDOPA AND LEVODOPA 1 TABLET(S): 25; 100 TABLET ORAL at 17:46

## 2021-08-16 RX ADMIN — CEFTRIAXONE 100 MILLIGRAM(S): 500 INJECTION, POWDER, FOR SOLUTION INTRAMUSCULAR; INTRAVENOUS at 05:05

## 2021-08-16 RX ADMIN — PREGABALIN 1000 MICROGRAM(S): 225 CAPSULE ORAL at 22:33

## 2021-08-16 RX ADMIN — DONEPEZIL HYDROCHLORIDE 10 MILLIGRAM(S): 10 TABLET, FILM COATED ORAL at 22:33

## 2021-08-16 RX ADMIN — ENOXAPARIN SODIUM 40 MILLIGRAM(S): 100 INJECTION SUBCUTANEOUS at 13:50

## 2021-08-16 NOTE — PROGRESS NOTE ADULT - PROBLEM SELECTOR PLAN 7
- Aricept currently on hold due to dysphagia. RESTART AFTER SLP assessment.   - Safety precautions.
- Aricept resumed  - Safety precautions.

## 2021-08-16 NOTE — PROGRESS NOTE ADULT - PROBLEM SELECTOR PLAN 1
- CXR clear, saturating well on room air.   - Continue Airborne precautions. Monitor O2.   - F/U Inflammatory markers.   - Supplemental O2 if needed, titrate as per guidelines.  - Supportive measures: Tylenol WI, Pro Air inhaler PRN.   - Incentive spirometry.
- CXR clear, saturating well on room air.   - Continue Airborne precautions. Monitor O2.   - F/U Inflammatory markers.   - Supplemental O2 if needed  - Supportive measures: Tylenol MI, Pro Air inhaler PRN.   - Incentive spirometry.

## 2021-08-16 NOTE — PROGRESS NOTE ADULT - PROBLEM SELECTOR PLAN 6
- Sinemet resumed  - Fall and seizure precautions.
- Sinemet currently on hold. RESTART AFTER SLP ASSESSMENT.   - Fall and seizure precautions.

## 2021-08-16 NOTE — SWALLOW BEDSIDE ASSESSMENT ADULT - SWALLOW EVAL: DIAGNOSIS
1. Patient demonstrated a functional oral management for puree, nectar thick and thin liquid textures marked by adequate bolus collection, transfer, and posterior transport. Pt exhibited volitional bolus hold for PO trials of nectar thick and thin liquid trials with adequate transport. 2. Patient demonstrated a mild-moderate oral dysphagia for solids marked by prolonged chewing and pt exhibiting piecemeal transport to clear bolus from oral cavity. 3. Patient demonstrated a functional pharyngeal phase of swallow for puree, solids, nectar thick and thin liquid textures marked by a timely pharyngeal swallow with hyolaryngeal elevation noted upon digital palpation without evidence of airway penetration/aspiration.

## 2021-08-16 NOTE — PROGRESS NOTE ADULT - PROBLEM SELECTOR PLAN 3
- UA positive Nitrates. f/u Blood and urine cx.   - Continue Ceftriaxone for now.   - Supportive measures: Tylenol PRN
- UA positive Nitrates. f/u Blood and urine cx.   - Continue Ceftriaxone for now.   - Supportive measures: Tylenol PRN

## 2021-08-16 NOTE — PROGRESS NOTE ADULT - PROBLEM SELECTOR PLAN 2
- Failed bedside dysphagia screen.  - NPO including meds due to aspiration risk.  - Aspiration precautions.  - F/U Speech and swallow eval.  - Hold IVF due to COVID infection and risk of pleural effusion.
participated with speech pathologist this AM  soft diet with thin liquids

## 2021-08-16 NOTE — PROGRESS NOTE ADULT - PROBLEM SELECTOR PLAN 8
- VTE with Lovenox 40 mg sub cut daily.  - Fall, aspiration, safety and seizure precautions.  dispo -home
- VTE with Lovenox 40 mg sub cut daily.  - Fall, aspiration, safety and seizure precautions.  - Speech and swallow eval- PENDING.   - RESTART HOME MEDICATIONS WHEN ABLE TO TOLERATE (Failed initial assessment by RN).

## 2021-08-16 NOTE — PROGRESS NOTE ADULT - ASSESSMENT
A 89F, history of Parkinson's disease, also history of alzheimer's, DM2 and Hyperlipidemia, admitted for generalized weakness most likely due to COVID infection, UTI and failed bedside dysphagia screen.        Dementia  Chronic moderate exacerbation  Continue donepezil 10mg daily once sees speech    Parkinsons  Chronic stable   Continue carbidopa-levodopa 10-100mg BID once sees speech    T2DM  Chronic moderate exacerbation     LDCS, currently NPO
A 89F, history of Parkinson's disease, also history of alzheimer's, DM2 and Hyperlipidemia, admitted for generalized weakness most likely due to COVID infection, UTI and failed bedside dysphagia screen.

## 2021-08-16 NOTE — PROGRESS NOTE ADULT - SUBJECTIVE AND OBJECTIVE BOX
[facility] Division of Hospital Medicine  Tisha Barragan MD  Pager [phone]    Patient is a 89y old  Female who presents with a chief complaint of Generalized weakness       SUBJECTIVE / OVERNIGHT EVENTS: pt without complaints      MEDICATIONS  (STANDING):  cefTRIAXone   IVPB      cefTRIAXone   IVPB 1000 milliGRAM(s) IV Intermittent every 24 hours  dextrose 40% Gel 15 Gram(s) Oral once  dextrose 5%. 1000 milliLiter(s) (50 mL/Hr) IV Continuous <Continuous>  dextrose 5%. 1000 milliLiter(s) (100 mL/Hr) IV Continuous <Continuous>  dextrose 50% Injectable 25 Gram(s) IV Push once  dextrose 50% Injectable 12.5 Gram(s) IV Push once  dextrose 50% Injectable 25 Gram(s) IV Push once  enoxaparin Injectable 40 milliGRAM(s) SubCutaneous daily  glucagon  Injectable 1 milliGRAM(s) IntraMuscular once  insulin lispro (ADMELOG) corrective regimen sliding scale   SubCutaneous three times a day before meals  insulin lispro (ADMELOG) corrective regimen sliding scale   SubCutaneous at bedtime    MEDICATIONS  (PRN):  acetaminophen  Suppository .. 650 milliGRAM(s) Rectal every 6 hours PRN Temp greater or equal to 38C (100.4F), Mild Pain (1 - 3), Moderate Pain (4 - 6)  ALBUTerol    90 MICROgram(s) HFA Inhaler 2 Puff(s) Inhalation every 6 hours PRN Shortness of Breath and/or Wheezing      CAPILLARY BLOOD GLUCOSE  POCT Blood Glucose.: 117 mg/dL (16 Aug 2021 12:38)  POCT Blood Glucose.: 102 mg/dL (16 Aug 2021 08:59)  POCT Blood Glucose.: 100 mg/dL (16 Aug 2021 01:30)  POCT Blood Glucose.: 108 mg/dL (15 Aug 2021 21:16)  POCT Blood Glucose.: 115 mg/dL (15 Aug 2021 17:19)        PHYSICAL EXAM:  Vital Signs Last 24 Hrs  T(F): 98.8 (16 Aug 2021 01:36), Max: 98.8 (16 Aug 2021 01:36)  HR: 81 (16 Aug 2021 01:36) (65 - 81)  BP: 138/63 (16 Aug 2021 01:36) (117/50 - 138/63)  RR: 19 (16 Aug 2021 01:36) (18 - 19)  SpO2: 99% (16 Aug 2021 01:36) (99% - 100%)    CONSTITUTIONAL: NAD, appears comfortable  EYES: PERRLA; conjunctiva and sclera clear  ENMT: Moist oral mucosa; normal dentition  RESPIRATORY: Normal respiratory effort; speaks full sentences  CARDIOVASCULAR; No lower extremity edema; Peripheral pulses are 2+ bilaterally  ABDOMEN: Nontender to palpation no rebound/guarding;   MUSCULOSKELETAL:  no clubbing or cyanosis of digits; no joint swelling or tenderness to palpation  PSYCH: affect appropriate; calm, coop  NEUROLOGY: CN 2-12 are grossly intact and symmetric; no gross sensory deficits   SKIN: No rashes; no palpable lesions    LABS:                        12.6   9.15  )-----------( 365      ( 16 Aug 2021 08:19 )             39.2     08-16    136  |  99  |  16  ----------------------------<  100<H>  4.2   |  24  |  0.70    Ca    9.4      16 Aug 2021 08:19  Phos  3.6     08-16  Mg     1.90     08-16    TPro  6.9  /  Alb  3.5  /  TBili  0.6  /  DBili  x   /  AST  15  /  ALT  10  /  AlkPhos  49  08-16    PT/INR - ( 15 Aug 2021 07:53 )   PT: 12.0 sec;   INR: 1.05 ratio         PTT - ( 15 Aug 2021 07:53 )  PTT:31.1 sec        
PROGRESS NOTE:     Patient is a 89y old  Female who presents with a chief complaint of Generalized weakness (15 Aug 2021 03:44)    SUBJECTIVE / OVERNIGHT EVENTS: Patient seen and evaluated at bedside. No acute distress evident, alert, awake, comfortable appearing, denies any current complaints of sob, chest pain, abd pain. Afebrile, saturating well on room air.     ADDITIONAL REVIEW OF SYSTEMS:    MEDICATIONS  (STANDING):  cefTRIAXone   IVPB      dextrose 40% Gel 15 Gram(s) Oral once  dextrose 5%. 1000 milliLiter(s) (50 mL/Hr) IV Continuous <Continuous>  dextrose 5%. 1000 milliLiter(s) (100 mL/Hr) IV Continuous <Continuous>  dextrose 50% Injectable 25 Gram(s) IV Push once  dextrose 50% Injectable 12.5 Gram(s) IV Push once  dextrose 50% Injectable 25 Gram(s) IV Push once  enoxaparin Injectable 40 milliGRAM(s) SubCutaneous daily  glucagon  Injectable 1 milliGRAM(s) IntraMuscular once  insulin lispro (ADMELOG) corrective regimen sliding scale   SubCutaneous three times a day before meals  insulin lispro (ADMELOG) corrective regimen sliding scale   SubCutaneous at bedtime    MEDICATIONS  (PRN):  acetaminophen  Suppository .. 650 milliGRAM(s) Rectal every 6 hours PRN Temp greater or equal to 38C (100.4F), Mild Pain (1 - 3), Moderate Pain (4 - 6)  ALBUTerol    90 MICROgram(s) HFA Inhaler 2 Puff(s) Inhalation every 6 hours PRN Shortness of Breath and/or Wheezing    CAPILLARY BLOOD GLUCOSE  POCT Blood Glucose.: 103 mg/dL (15 Aug 2021 12:13)  POCT Blood Glucose.: 113 mg/dL (15 Aug 2021 08:50)  POCT Blood Glucose.: 108 mg/dL (15 Aug 2021 02:42)  POCT Blood Glucose.: 152 mg/dL (14 Aug 2021 19:57)    I&O's Summary    PHYSICAL EXAM:  Vital Signs Last 24 Hrs  T(C): 36.6 (15 Aug 2021 11:08), Max: 37.8 (14 Aug 2021 20:23)  T(F): 97.8 (15 Aug 2021 11:08), Max: 100.1 (14 Aug 2021 20:23)  HR: 77 (15 Aug 2021 11:08) (62 - 85)  BP: 120/50 (15 Aug 2021 11:08) (105/67 - 134/51)  BP(mean): --  RR: 17 (15 Aug 2021 11:08) (16 - 24)  SpO2: 100% (15 Aug 2021 11:08) (95% - 100%)    CONSTITUTIONAL: NAD, well-developed, comfortable appearing, on RA.  RESPIRATORY: Normal respiratory effort; lungs are clear to auscultation bilaterally  CARDIOVASCULAR: Regular rate and rhythm, normal S1 and S2  No lower extremity edema; Peripheral pulses are 2+ bilaterally  ABDOMEN: Nontender to palpation, normoactive bowel sounds  MUSCLOSKELETAL: no clubbing or cyanosis of digits; no joint swelling or tenderness to palpation  PSYCH: A+O to person, place, and time; affect appropriate    LABS: reviewed.                         12.5   10.50 )-----------( 328      ( 15 Aug 2021 07:53 )             37.4     08-15    139  |  103  |  16  ----------------------------<  106<H>  4.2   |  23  |  0.74    Ca    9.5      15 Aug 2021 07:53  Phos  3.5     08-15  Mg     1.80     08-15    TPro  7.0  /  Alb  3.7  /  TBili  0.4  /  DBili  x   /  AST  19  /  ALT  13  /  AlkPhos  60  08-14    PT/INR - ( 15 Aug 2021 07:53 )   PT: 12.0 sec;   INR: 1.05 ratio       PTT - ( 15 Aug 2021 07:53 )  PTT:31.1 sec    Urinalysis Basic - ( 14 Aug 2021 21:46 )    Color: Yellow / Appearance: Clear / S.023 / pH: x  Gluc: x / Ketone: Negative  / Bili: Negative / Urobili: <2 mg/dL   Blood: x / Protein: Trace / Nitrite: Positive   Leuk Esterase: Negative / RBC: 67 /HPF / WBC 4 /HPF   Sq Epi: x / Non Sq Epi: 4 /HPF / Bacteria: Many    RADIOLOGY & ADDITIONAL TESTS:  Results Reviewed:   Imaging Personally Reviewed:  Electrocardiogram Personally Reviewed:    COORDINATION OF CARE:  Care Discussed with Consultants/Other Providers [Y/N]:  Prior or Outpatient Records Reviewed [Y/N]:

## 2021-08-16 NOTE — PROGRESS NOTE ADULT - PROBLEM SELECTOR PLAN 5
- POC monitoring, Low corrective ISS with hypoglycemia protocol.  - Hold Januvia for now.
- POC monitoring, Low corrective ISS with hypoglycemia protocol.  - Hold Januvia for now.

## 2021-08-16 NOTE — SWALLOW BEDSIDE ASSESSMENT ADULT - COMMENTS
Per progress note, patient is "89F, history of Parkinson's disease, also history of alzheimer's, DM2 and Hyperlipidemia, admitted for generalized weakness most likely due to COVID infection, UTI and failed bedside dysphagia screen."    WBC is WFL. Most recent CXR revealed "Possible right paratracheal opacity. Consider chest CT".    Consult received and chart reviewed. Patient seen at bedside this AM for an initial assessment of the swallow function, at which time patient was alert and cooperative. Patient is COVID+ and tolerating room air at this time. Patient able to follow directives given verbal and gestural prompting (i.e. teaspoon/cup presentation) and was minimally verbally responsive.

## 2021-08-17 VITALS
SYSTOLIC BLOOD PRESSURE: 119 MMHG | HEART RATE: 82 BPM | OXYGEN SATURATION: 95 % | DIASTOLIC BLOOD PRESSURE: 52 MMHG | RESPIRATION RATE: 18 BRPM | TEMPERATURE: 99 F

## 2021-08-17 LAB
-  AMIKACIN: SIGNIFICANT CHANGE UP
-  AMOXICILLIN/CLAVULANIC ACID: SIGNIFICANT CHANGE UP
-  AMPICILLIN/SULBACTAM: SIGNIFICANT CHANGE UP
-  AMPICILLIN: SIGNIFICANT CHANGE UP
-  AZTREONAM: SIGNIFICANT CHANGE UP
-  CEFAZOLIN: SIGNIFICANT CHANGE UP
-  CEFEPIME: SIGNIFICANT CHANGE UP
-  CEFOXITIN: SIGNIFICANT CHANGE UP
-  CEFTRIAXONE: SIGNIFICANT CHANGE UP
-  CIPROFLOXACIN: SIGNIFICANT CHANGE UP
-  ERTAPENEM: SIGNIFICANT CHANGE UP
-  GENTAMICIN: SIGNIFICANT CHANGE UP
-  IMIPENEM: SIGNIFICANT CHANGE UP
-  LEVOFLOXACIN: SIGNIFICANT CHANGE UP
-  MEROPENEM: SIGNIFICANT CHANGE UP
-  NITROFURANTOIN: SIGNIFICANT CHANGE UP
-  PIPERACILLIN/TAZOBACTAM: SIGNIFICANT CHANGE UP
-  TIGECYCLINE: SIGNIFICANT CHANGE UP
-  TOBRAMYCIN: SIGNIFICANT CHANGE UP
-  TRIMETHOPRIM/SULFAMETHOXAZOLE: SIGNIFICANT CHANGE UP
ANION GAP SERPL CALC-SCNC: 13 MMOL/L — SIGNIFICANT CHANGE UP (ref 7–14)
BUN SERPL-MCNC: 17 MG/DL — SIGNIFICANT CHANGE UP (ref 7–23)
CALCIUM SERPL-MCNC: 9.4 MG/DL — SIGNIFICANT CHANGE UP (ref 8.4–10.5)
CHLORIDE SERPL-SCNC: 99 MMOL/L — SIGNIFICANT CHANGE UP (ref 98–107)
CO2 SERPL-SCNC: 23 MMOL/L — SIGNIFICANT CHANGE UP (ref 22–31)
CREAT SERPL-MCNC: 0.74 MG/DL — SIGNIFICANT CHANGE UP (ref 0.5–1.3)
CULTURE RESULTS: SIGNIFICANT CHANGE UP
GLUCOSE BLDC GLUCOMTR-MCNC: 111 MG/DL — HIGH (ref 70–99)
GLUCOSE BLDC GLUCOMTR-MCNC: 145 MG/DL — HIGH (ref 70–99)
GLUCOSE SERPL-MCNC: 108 MG/DL — HIGH (ref 70–99)
HCT VFR BLD CALC: 36.7 % — SIGNIFICANT CHANGE UP (ref 34.5–45)
HGB BLD-MCNC: 12 G/DL — SIGNIFICANT CHANGE UP (ref 11.5–15.5)
MAGNESIUM SERPL-MCNC: 1.9 MG/DL — SIGNIFICANT CHANGE UP (ref 1.6–2.6)
MCHC RBC-ENTMCNC: 29.6 PG — SIGNIFICANT CHANGE UP (ref 27–34)
MCHC RBC-ENTMCNC: 32.7 GM/DL — SIGNIFICANT CHANGE UP (ref 32–36)
MCV RBC AUTO: 90.6 FL — SIGNIFICANT CHANGE UP (ref 80–100)
METHOD TYPE: SIGNIFICANT CHANGE UP
NRBC # BLD: 0 /100 WBCS — SIGNIFICANT CHANGE UP
NRBC # FLD: 0 K/UL — SIGNIFICANT CHANGE UP
ORGANISM # SPEC MICROSCOPIC CNT: SIGNIFICANT CHANGE UP
ORGANISM # SPEC MICROSCOPIC CNT: SIGNIFICANT CHANGE UP
PHOSPHATE SERPL-MCNC: 3.5 MG/DL — SIGNIFICANT CHANGE UP (ref 2.5–4.5)
PLATELET # BLD AUTO: 373 K/UL — SIGNIFICANT CHANGE UP (ref 150–400)
POTASSIUM SERPL-MCNC: 4 MMOL/L — SIGNIFICANT CHANGE UP (ref 3.5–5.3)
POTASSIUM SERPL-SCNC: 4 MMOL/L — SIGNIFICANT CHANGE UP (ref 3.5–5.3)
RBC # BLD: 4.05 M/UL — SIGNIFICANT CHANGE UP (ref 3.8–5.2)
RBC # FLD: 12.9 % — SIGNIFICANT CHANGE UP (ref 10.3–14.5)
SODIUM SERPL-SCNC: 135 MMOL/L — SIGNIFICANT CHANGE UP (ref 135–145)
SPECIMEN SOURCE: SIGNIFICANT CHANGE UP
WBC # BLD: 8.89 K/UL — SIGNIFICANT CHANGE UP (ref 3.8–10.5)
WBC # FLD AUTO: 8.89 K/UL — SIGNIFICANT CHANGE UP (ref 3.8–10.5)

## 2021-08-17 PROCEDURE — 99239 HOSP IP/OBS DSCHRG MGMT >30: CPT

## 2021-08-17 RX ORDER — ATORVASTATIN CALCIUM 80 MG/1
1 TABLET, FILM COATED ORAL
Qty: 0 | Refills: 0 | DISCHARGE

## 2021-08-17 RX ORDER — RIVAROXABAN 15 MG-20MG
1 KIT ORAL
Qty: 30 | Refills: 0
Start: 2021-08-17 | End: 2021-09-15

## 2021-08-17 RX ADMIN — Medication 81 MILLIGRAM(S): at 09:30

## 2021-08-17 RX ADMIN — Medication 50 MILLIGRAM(S): at 09:29

## 2021-08-17 RX ADMIN — CEFTRIAXONE 100 MILLIGRAM(S): 500 INJECTION, POWDER, FOR SOLUTION INTRAMUSCULAR; INTRAVENOUS at 02:40

## 2021-08-17 RX ADMIN — ENOXAPARIN SODIUM 40 MILLIGRAM(S): 100 INJECTION SUBCUTANEOUS at 09:30

## 2021-08-17 RX ADMIN — CARBIDOPA AND LEVODOPA 1 TABLET(S): 25; 100 TABLET ORAL at 09:30

## 2021-08-17 RX ADMIN — CARBIDOPA AND LEVODOPA 1 TABLET(S): 25; 100 TABLET ORAL at 15:27

## 2021-08-17 NOTE — CHART NOTE - NSCHARTNOTEFT_GEN_A_CORE
pt seen and examined by me  doing well  no RA  asymptomatic  bedbound at baseline  passed swallow eval  eating with assistance  medically stable for dc  33 min spent with dc planning

## 2021-08-17 NOTE — DISCHARGE NOTE NURSING/CASE MANAGEMENT/SOCIAL WORK - NSDCCRNAME_GEN_ALL_CORE_FT
[facility]- This agency will resume your CDPAP.  Please call agency to confirm your discharge.
good balance

## 2021-08-17 NOTE — DISCHARGE NOTE NURSING/CASE MANAGEMENT/SOCIAL WORK - PATIENT PORTAL LINK FT
You can access the FollowMyHealth Patient Portal offered by [facility] by registering at the following website: [email or web address]. By joining [employer]’s FollowMyHealth portal, you will also be able to view your health information using other applications (apps) compatible with our system.

## 2021-08-17 NOTE — DISCHARGE NOTE PROVIDER - NSDCCPCAREPLAN_GEN_ALL_CORE_FT
PRINCIPAL DISCHARGE DIAGNOSIS  Diagnosis: 2019 novel coronavirus disease (COVID-19)  Assessment and Plan of Treatment: You were found to be covid positive, you did not require any oxygen requirements therefore no steriods or remdesivir were given. follow up with your PCP in 1 week.   Please follow full instructions listed in your  paperwork. Please self quarantine at home for total of 10 days since COVID + and stay 6 feet away minimum from other individuals and animals in your home. Do not go to work, school, public areas. Do not use public transportation, ride-sharing, or taxis. Restrict outside activity except for medical care.  Please call ahead if you have an appointment with your doctor to inform them of your COVID positive status. Always wear a facemask at home. Cover your sneezes and coughs, and wash hands with soap and water for at least 20 seconds frequently. Avoid sharing personal household items. Clean all "high-touch" surfaces where you contact frequently such as counters and doorknobs frequently.   It has been determined that you no longer need hospitalization and can recover while remaining in self-quarantine at home. You should follow the prevention steps below until a healthcare provider or local or state health department says you can return to your normal activities. Your [place] Department of Health can be reached at [phone] for further information about COVID-19.    If you have any worsening breathing, faster breathing or trouble with breathing call 911 immediately or your healthcare provider ahead of time; If possible wear a facemask before being seen by medical personel.  Take tylenol for your fevers.        SECONDARY DISCHARGE DIAGNOSES  Diagnosis: Dysphagia  Assessment and Plan of Treatment: You were evaluated by Speech Pathologist and it was recommended a dysphagia diet mechanical soft diet with thin fluids    Diagnosis: Parkinsons  Assessment and Plan of Treatment: continue Sinemet    Diagnosis: UTI (urinary tract infection)  Assessment and Plan of Treatment: You completed course of antibiotics while in the hospital for a urinary tract infection

## 2021-08-17 NOTE — DISCHARGE NOTE PROVIDER - NSDCMRMEDTOKEN_GEN_ALL_CORE_FT
Aricept 10 mg oral tablet: 1 tab(s) orally once a day (at bedtime)  aspirin 81 mg oral delayed release tablet: 1 tab(s) orally once a day  Januvia 25 mg oral tablet: 1 tab(s) orally once a day  Sinemet 10 mg-100 mg oral tablet: 1 tab(s) orally 2 times a day  Vitamin B12 1000 mcg oral tablet: 1 tab(s) orally once a day  Vitamin B6 50 mg oral tablet: 1 tab(s) orally once a day   Aricept 10 mg oral tablet: 1 tab(s) orally once a day (at bedtime)  aspirin 81 mg oral delayed release tablet: 1 tab(s) orally once a day  Januvia 25 mg oral tablet: 1 tab(s) orally once a day  rivaroxaban 10 mg oral tablet: 1 tab(s) orally once a day   Sinemet 10 mg-100 mg oral tablet: 1 tab(s) orally 2 times a day  Vitamin B12 1000 mcg oral tablet: 1 tab(s) orally once a day  Vitamin B6 50 mg oral tablet: 1 tab(s) orally once a day

## 2021-08-17 NOTE — DISCHARGE NOTE NURSING/CASE MANAGEMENT/SOCIAL WORK - NSSCNAMETXT_GEN_ALL_CORE
[facility] [place].  This agency will send a nurse to evaluate your care once approved by your insurance. [facility]- This agency will send a nurse to evaluate your care once approved by your insurance.

## 2021-08-17 NOTE — DISCHARGE NOTE PROVIDER - HOSPITAL COURSE
A 89F, history of Parkinson's disease, also history of alzheimer's, DM2 and Hyperlipidemia, admitted for generalized weakness most likely due to COVID infection, UTI and failed bedside dysphagia screen.    2019 novel coronavirus disease (COVID-19).  Plan: - CXR clear, saturating well on room air.   - S/p Airborne precautions. Monitor O2.   - Inflammatory markers. q72hrs   - Supplemental O2 if needed, patient sating well on 97%   - Supportive measures: Tylenol ND, Pro Air inhaler PRN.   - Incentive spirometry.     Dysphagia.  Plan: speech and swallow eval   Speech Pathology eval: soft diet with thin liquids.     UTI (urinary tract infection).  Plan: - UA positive Nitrates. f/u Blood and urine cx.   - s/p Ceftriaxone completed 3 days   -Blood Cx negative, UCx: E.Coli / Aeroccus   - Supportive measures: Tylenol PRN.      Hyperlipidemia.  Plan: -Continue Statin.      Diabetes mellitus, type 2.  Plan: - POC monitoring, Low corrective ISS with hypoglycemia protocol.  - Hold Januvia for now.      Parkinsons. Plan: - Sinemet resumed  - Fall and seizure precautions.    Dementia.  Plan: - Aricept resumed  - Safety precautions.     Need for prophylactic measure.  Plan: - VTE with Lovenox 40 mg sub cut daily.  - Fall, aspiration, safety and seizure precautions.  dispo -home.       Patient stable and cleared for discharge home d/w Dr Barragan 8/17/21. Patient medications sent to a mutually agreed upon pharmacy. A 89F, history of Parkinson's disease, also history of alzheimer's, DM2 and Hyperlipidemia, admitted for generalized weakness most likely due to COVID infection, UTI and failed bedside dysphagia screen.    2019 novel coronavirus disease (COVID-19).  Plan: - CXR clear, saturating well on room air.   - S/p Airborne precautions. Monitor O2.   - Inflammatory markers. q72hrs   - Supplemental O2 if needed, patient sating well on 97%   - Supportive measures: Tylenol PA, Pro Air inhaler PRN.   - Incentive spirometry.     Dysphagia.  Plan: speech and swallow eval   Speech Pathology eval: soft diet with thin liquids.     UTI (urinary tract infection).  Plan: - UA positive Nitrates. f/u Blood and urine cx.   - s/p Ceftriaxone completed 3 days   -Blood Cx negative, UCx: E.Coli / Aeroccus   - Supportive measures: Tylenol PRN.      Hyperlipidemia.  Plan: -Continue Statin.      Diabetes mellitus, type 2.  Plan: - POC monitoring, Low corrective ISS with hypoglycemia protocol.  - Hold Januvia for now.      Parkinsons. Plan: - Sinemet resumed  - Fall and seizure precautions.    Dementia.  Plan: - Aricept resumed  - Safety precautions.     Need for prophylactic measure.  Plan: - VTE with Lovenox 40 mg sub cut daily.  - Fall, aspiration, safety and seizure precautions.  dispo -home.       Patient stable and cleared for discharge home d/w Dr Barragan 8/17/21. Patient medications sent to a mutually agreed upon pharmacy.

## 2021-08-17 NOTE — DISCHARGE NOTE PROVIDER - PROVIDER TOKENS
FREE:[LAST:[Chambers],FIRST:[Juan],PHONE:[phone]],FAX:[phone]   [phone]    -],ADDRESS:[PCP],FOLLOWUP:[1 week]]

## 2021-08-21 LAB
CULTURE RESULTS: SIGNIFICANT CHANGE UP
CULTURE RESULTS: SIGNIFICANT CHANGE UP
SPECIMEN SOURCE: SIGNIFICANT CHANGE UP
SPECIMEN SOURCE: SIGNIFICANT CHANGE UP

## 2025-03-08 NOTE — H&P ADULT - PSYCHIATRIC DETAILS
Jennifer Oliver  Pediatrics  [id number] [address] [place] [id number]  Phone: [phone]  Fax: [phone]  Follow Up Time: 1-3 days   normal affect/normal behavior